# Patient Record
Sex: FEMALE | URBAN - METROPOLITAN AREA
[De-identification: names, ages, dates, MRNs, and addresses within clinical notes are randomized per-mention and may not be internally consistent; named-entity substitution may affect disease eponyms.]

---

## 2023-03-24 ENCOUNTER — APPOINTMENT (RX ONLY)
Dept: URBAN - METROPOLITAN AREA CLINIC 116 | Facility: CLINIC | Age: 38
Setting detail: DERMATOLOGY
End: 2023-03-24

## 2023-03-24 DIAGNOSIS — Z41.9 ENCOUNTER FOR PROCEDURE FOR PURPOSES OTHER THAN REMEDYING HEALTH STATE, UNSPECIFIED: ICD-10-CM

## 2023-03-24 PROCEDURE — ? SIGNATURE HYDRAFACIAL

## 2023-03-24 ASSESSMENT — LOCATION DETAILED DESCRIPTION DERM: LOCATION DETAILED: INFERIOR MID FOREHEAD

## 2023-03-24 ASSESSMENT — LOCATION ZONE DERM: LOCATION ZONE: FACE

## 2023-03-24 ASSESSMENT — LOCATION SIMPLE DESCRIPTION DERM: LOCATION SIMPLE: INFERIOR FOREHEAD

## 2023-03-24 NOTE — PROCEDURE: SIGNATURE HYDRAFACIAL
Solution Override
Tip Override
Vacuum Pressure High Setting (Will Not Render If Set To 0): 0
Vacuum Pressure Low Setting (Will Not Render If Set To 0): [address]
Procedure: Exfoliation
Number Of Passes: 1
Vacuum Pressure Low Setting (Will Not Render If Set To 0): 16
Solution: Beta-HD
Indication: skin texture
Solution: Activ-4
Tip: Hydropeel Tip, Blue
Post-Care Instructions: I reviewed with the patient in detail post-care instructions. Patient should stay away from the sun and wear sun protection until treated areas are fully healed.
Procedure: Fusion
Price (Use Numbers Only, No Special Characters Or $): 175.00
Procedure: Peel
Location: face
Tip: Hydropeel Tip, Clear
Tip: Hydropeel Tip, Teal
Procedure: Extraction
Solution: GlySal 7.5%
Vacuum Pressure Low Setting (Will Not Render If Set To 0): [address] [facility]
Consent: Written consent obtained, risks reviewed including but not limited to crusting, scabbing, blistering, scarring, darker or lighter pigmentary change, bruising, and/or incomplete response.
Number Of Passes: 2

## 2023-11-17 ENCOUNTER — APPOINTMENT (RX ONLY)
Dept: URBAN - METROPOLITAN AREA CLINIC 116 | Facility: CLINIC | Age: 38
Setting detail: DERMATOLOGY
End: 2023-11-17

## 2023-11-17 DIAGNOSIS — Z41.9 ENCOUNTER FOR PROCEDURE FOR PURPOSES OTHER THAN REMEDYING HEALTH STATE, UNSPECIFIED: ICD-10-CM

## 2023-11-17 PROCEDURE — ? SIGNATURE HYDRAFACIAL

## 2023-11-17 ASSESSMENT — LOCATION DETAILED DESCRIPTION DERM: LOCATION DETAILED: INFERIOR MID FOREHEAD

## 2023-11-17 ASSESSMENT — LOCATION SIMPLE DESCRIPTION DERM: LOCATION SIMPLE: INFERIOR FOREHEAD

## 2023-11-17 ASSESSMENT — LOCATION ZONE DERM: LOCATION ZONE: FACE

## 2023-11-17 NOTE — PROCEDURE: SIGNATURE HYDRAFACIAL
Vacuum Pressure Low Setting (Will Not Render If Set To 0): [address]
Solution: Beta-HD
Solution Override
Vacuum Pressure Low Setting (Will Not Render If Set To 0): 0
Location: face
Solution: Activ-4
Indication: skin texture
Tip: Hydropeel Tip, Blue
Tip Override
Tip: Hydropeel Tip, Clear
Post-Care Instructions: I reviewed with the patient in detail post-care instructions. Patient should stay away from the sun and wear sun protection until treated areas are fully healed.
Vacuum Pressure Low Setting (Will Not Render If Set To 0): [facility]
Vacuum Pressure Low Setting (Will Not Render If Set To 0): 16
Consent: Written consent obtained, risks reviewed including but not limited to crusting, scabbing, blistering, scarring, darker or lighter pigmentary change, bruising, and/or incomplete response.
Procedure: Peel
Tip: Hydropeel Tip, Teal
Procedure: Fusion
Procedure: Exfoliation
Number Of Passes: 1
Solution: GlySal 7.5%
Price (Use Numbers Only, No Special Characters Or $): 175.00
Procedure: Extraction
Number Of Passes: 2

## 2024-01-26 ENCOUNTER — APPOINTMENT (RX ONLY)
Dept: URBAN - METROPOLITAN AREA CLINIC 116 | Facility: CLINIC | Age: 39
Setting detail: DERMATOLOGY
End: 2024-01-26

## 2024-01-26 DIAGNOSIS — Z41.9 ENCOUNTER FOR PROCEDURE FOR PURPOSES OTHER THAN REMEDYING HEALTH STATE, UNSPECIFIED: ICD-10-CM

## 2024-01-26 PROCEDURE — ? DELUXE HYDRAFACIAL

## 2024-01-26 ASSESSMENT — LOCATION DETAILED DESCRIPTION DERM: LOCATION DETAILED: LEFT MEDIAL FOREHEAD

## 2024-01-26 ASSESSMENT — LOCATION ZONE DERM: LOCATION ZONE: FACE

## 2024-01-26 ASSESSMENT — LOCATION SIMPLE DESCRIPTION DERM: LOCATION SIMPLE: LEFT FOREHEAD

## 2024-01-26 NOTE — PROCEDURE: DELUXE HYDRAFACIAL
Vacuum Pressure High Setting (Will Not Render If Set To 0): 0
Number Of Passes: 1
Vacuum Pressure Low Setting (Will Not Render If Set To 0): 16
Price (Use Numbers Only, No Special Characters Or $): 255.00
Tip: Hydropeel Tip, Teal
Number Of Passes: 2
Solution: GlySal 7.5%
Solution Override
Tip: Hydropeel Tip, Blue
Tip: Hydropeel Tip, Clear
Procedure: Fusion
Location: face
Procedure: Extraction
Consent: Written consent obtained, risks reviewed including but not limited to crusting, scabbing, blistering, scarring, darker or lighter pigmentary change, bruising, and/or incomplete response.
Post-Care Instructions: I reviewed with the patient in detail post-care instructions. Patient should stay away from the sun and wear sun protection until treated areas are fully healed.
Procedure: Exfoliation
Solution Override: BOOSTER
Solution: Beta-HD
Tip Override
Procedure: Peel
Solution: Activ-4
Indication: anti-aging
Solution: Antiox-6
Procedure: Boost
Vacuum Pressure Low Setting (Will Not Render If Set To 0): 22

## 2024-06-28 ENCOUNTER — APPOINTMENT (RX ONLY)
Dept: URBAN - METROPOLITAN AREA CLINIC 116 | Facility: CLINIC | Age: 39
Setting detail: DERMATOLOGY
End: 2024-06-28

## 2024-06-28 DIAGNOSIS — Z41.9 ENCOUNTER FOR PROCEDURE FOR PURPOSES OTHER THAN REMEDYING HEALTH STATE, UNSPECIFIED: ICD-10-CM

## 2024-06-28 PROCEDURE — ? SIGNATURE HYDRAFACIAL

## 2024-06-28 ASSESSMENT — LOCATION ZONE DERM: LOCATION ZONE: FACE

## 2024-06-28 ASSESSMENT — LOCATION DETAILED DESCRIPTION DERM: LOCATION DETAILED: INFERIOR MID FOREHEAD

## 2024-06-28 ASSESSMENT — LOCATION SIMPLE DESCRIPTION DERM: LOCATION SIMPLE: INFERIOR FOREHEAD

## 2024-06-28 NOTE — PROCEDURE: SIGNATURE HYDRAFACIAL
Tip: Hydropeel Tip, Clear
Vacuum Pressure High Setting (Will Not Render If Set To 0): 0
Number Of Passes: 2
Location: face
Vacuum Pressure Low Setting (Will Not Render If Set To 0): 16
Solution: Activ-4
Number Of Passes: 1
Indication: skin texture
Procedure: Peel
Solution Override
Tip Override
Vacuum Pressure Low Setting (Will Not Render If Set To 0): 22
Solution: GlySal 7.5%
Tip: Hydropeel Tip, Teal
Post-Care Instructions: I reviewed with the patient in detail post-care instructions. Patient should stay away from the sun and wear sun protection until treated areas are fully healed.
Procedure: Exfoliation
Procedure: Fusion
Solution: Beta-HD
Tip: Hydropeel Tip, Blue
Procedure: Extraction
Consent: Written consent obtained, risks reviewed including but not limited to crusting, scabbing, blistering, scarring, darker or lighter pigmentary change, bruising, and/or incomplete response.
Price (Use Numbers Only, No Special Characters Or $): 175.00

## 2024-11-01 ENCOUNTER — APPOINTMENT (RX ONLY)
Dept: URBAN - METROPOLITAN AREA CLINIC 116 | Facility: CLINIC | Age: 39
Setting detail: DERMATOLOGY
End: 2024-11-01

## 2024-11-01 DIAGNOSIS — Z41.9 ENCOUNTER FOR PROCEDURE FOR PURPOSES OTHER THAN REMEDYING HEALTH STATE, UNSPECIFIED: ICD-10-CM

## 2024-11-01 PROCEDURE — ? DELUXE HYDRAFACIAL

## 2024-11-01 ASSESSMENT — LOCATION DETAILED DESCRIPTION DERM: LOCATION DETAILED: LEFT MEDIAL FOREHEAD

## 2024-11-01 ASSESSMENT — LOCATION SIMPLE DESCRIPTION DERM: LOCATION SIMPLE: LEFT FOREHEAD

## 2024-11-01 ASSESSMENT — LOCATION ZONE DERM: LOCATION ZONE: FACE

## 2024-11-01 NOTE — PROCEDURE: DELUXE HYDRAFACIAL
Tip: Hydropeel Tip, Blue
Vacuum Pressure Low Setting (Will Not Render If Set To 0): 16
Solution Override
Number Of Passes: 1
Procedure: Exfoliation
Indication: anti-aging
Number Of Passes: 0
Procedure: Fusion
Solution Override: BOOSTER
Tip: Hydropeel Tip, Clear
Vacuum Pressure Low Setting (Will Not Render If Set To 0): 22
Procedure: Peel
Price (Use Numbers Only, No Special Characters Or $): 255.00
Procedure: Extraction
Solution: Activ-4
Procedure: Boost
Tip: Hydropeel Tip, Teal
Tip Override
Consent: Written consent obtained, risks reviewed including but not limited to crusting, scabbing, blistering, scarring, darker or lighter pigmentary change, bruising, and/or incomplete response.
Number Of Passes: 2
Location: face
Post-Care Instructions: I reviewed with the patient in detail post-care instructions. Patient should stay away from the sun and wear sun protection until treated areas are fully healed.
Solution: GlySal 7.5%
Solution: Beta-HD
Solution: Antiox-6

## 2025-01-02 ENCOUNTER — APPOINTMENT (OUTPATIENT)
Dept: URBAN - METROPOLITAN AREA CLINIC 116 | Facility: CLINIC | Age: 40
Setting detail: DERMATOLOGY
End: 2025-01-02

## 2025-01-02 DIAGNOSIS — Z41.9 ENCOUNTER FOR PROCEDURE FOR PURPOSES OTHER THAN REMEDYING HEALTH STATE, UNSPECIFIED: ICD-10-CM

## 2025-01-02 PROCEDURE — ? SIGNATURE HYDRAFACIAL

## 2025-01-02 ASSESSMENT — LOCATION DETAILED DESCRIPTION DERM: LOCATION DETAILED: INFERIOR MID FOREHEAD

## 2025-01-02 ASSESSMENT — LOCATION SIMPLE DESCRIPTION DERM: LOCATION SIMPLE: INFERIOR FOREHEAD

## 2025-01-02 ASSESSMENT — LOCATION ZONE DERM: LOCATION ZONE: FACE

## 2025-01-02 NOTE — PROCEDURE: SIGNATURE HYDRAFACIAL
Vacuum Pressure Low Setting (Will Not Render If Set To 0): 16
Solution Override
Solution: Beta-HD
Solution: Activ-4
Number Of Passes: 2
Solution: GlySal 7.5%
Indication: skin texture
Tip: Hydropeel Tip, Clear
Procedure: Peel
Consent: Written consent obtained, risks reviewed including but not limited to crusting, scabbing, blistering, scarring, darker or lighter pigmentary change, bruising, and/or incomplete response.
Vacuum Pressure High Setting (Will Not Render If Set To 0): 0
Post-Care Instructions: I reviewed with the patient in detail post-care instructions. Patient should stay away from the sun and wear sun protection until treated areas are fully healed.
Procedure: Exfoliation
Tip: Hydropeel Tip, Teal
Number Of Passes: 1
Procedure: Extraction
Tip: Hydropeel Tip, Blue
Price (Use Numbers Only, No Special Characters Or $): 175.00
Location: face
Procedure: Fusion
Vacuum Pressure Low Setting (Will Not Render If Set To 0): 22
Tip Override

## 2025-05-09 ENCOUNTER — APPOINTMENT (OUTPATIENT)
Dept: URBAN - METROPOLITAN AREA CLINIC 116 | Facility: CLINIC | Age: 40
Setting detail: DERMATOLOGY
End: 2025-05-09

## 2025-05-09 DIAGNOSIS — Z41.9 ENCOUNTER FOR PROCEDURE FOR PURPOSES OTHER THAN REMEDYING HEALTH STATE, UNSPECIFIED: ICD-10-CM

## 2025-05-09 PROCEDURE — ? SIGNATURE HYDRAFACIAL

## 2025-05-09 ASSESSMENT — LOCATION SIMPLE DESCRIPTION DERM: LOCATION SIMPLE: INFERIOR FOREHEAD

## 2025-05-09 ASSESSMENT — LOCATION DETAILED DESCRIPTION DERM: LOCATION DETAILED: INFERIOR MID FOREHEAD

## 2025-05-09 ASSESSMENT — LOCATION ZONE DERM: LOCATION ZONE: FACE

## 2025-05-09 NOTE — PROCEDURE: SIGNATURE HYDRAFACIAL
Solution Override
Procedure: Extraction
Vacuum Pressure High Setting (Will Not Render If Set To 0): 0
Post-Care Instructions: I reviewed with the patient in detail post-care instructions. Patient should stay away from the sun and wear sun protection until treated areas are fully healed.
Location: face
Solution: GlySal 7.5%
Procedure: Fusion
Tip: Hydropeel Tip, Clear
Number Of Passes: 1
Vacuum Pressure Low Setting (Will Not Render If Set To 0): 22
Tip: Hydropeel Tip, Blue
Procedure: Exfoliation
Tip: Hydropeel Tip, Teal
Number Of Passes: 2
Indication: skin texture
Tip Override
Vacuum Pressure Low Setting (Will Not Render If Set To 0): 16
Procedure: Peel
Price (Use Numbers Only, No Special Characters Or $): 185.00
Solution: Beta-HD
Solution: Activ-4
Consent: Written consent obtained, risks reviewed including but not limited to crusting, scabbing, blistering, scarring, darker or lighter pigmentary change, bruising, and/or incomplete response.